# Patient Record
Sex: MALE | Race: WHITE
[De-identification: names, ages, dates, MRNs, and addresses within clinical notes are randomized per-mention and may not be internally consistent; named-entity substitution may affect disease eponyms.]

---

## 2017-06-03 ENCOUNTER — HOSPITAL ENCOUNTER (EMERGENCY)
Dept: HOSPITAL 62 - ER | Age: 57
Discharge: HOME | End: 2017-06-03
Payer: MEDICARE

## 2017-06-03 VITALS — SYSTOLIC BLOOD PRESSURE: 116 MMHG | DIASTOLIC BLOOD PRESSURE: 68 MMHG

## 2017-06-03 DIAGNOSIS — E11.649: ICD-10-CM

## 2017-06-03 DIAGNOSIS — I10: ICD-10-CM

## 2017-06-03 DIAGNOSIS — F17.210: ICD-10-CM

## 2017-06-03 DIAGNOSIS — M79.2: Primary | ICD-10-CM

## 2017-06-03 DIAGNOSIS — Z88.6: ICD-10-CM

## 2017-06-03 DIAGNOSIS — E78.00: ICD-10-CM

## 2017-06-03 LAB
ALBUMIN SERPL-MCNC: 3.9 G/DL (ref 3.5–5)
ALP SERPL-CCNC: 90 U/L (ref 38–126)
ALT SERPL-CCNC: 30 U/L (ref 21–72)
ANION GAP SERPL CALC-SCNC: 12 MMOL/L (ref 5–19)
AST SERPL-CCNC: 21 U/L (ref 17–59)
BASOPHILS # BLD AUTO: 0.1 10^3/UL (ref 0–0.2)
BASOPHILS NFR BLD AUTO: 0.9 % (ref 0–2)
BILIRUB DIRECT SERPL-MCNC: 0.3 MG/DL (ref 0–0.4)
BILIRUB SERPL-MCNC: 0.6 MG/DL (ref 0.2–1.3)
BUN SERPL-MCNC: 14 MG/DL (ref 7–20)
CALCIUM: 9.3 MG/DL (ref 8.4–10.2)
CHLORIDE SERPL-SCNC: 95 MMOL/L (ref 98–107)
CO2 SERPL-SCNC: 32 MMOL/L (ref 22–30)
CREAT SERPL-MCNC: 1.58 MG/DL (ref 0.52–1.25)
EOSINOPHIL # BLD AUTO: 0.2 10^3/UL (ref 0–0.6)
EOSINOPHIL NFR BLD AUTO: 1.6 % (ref 0–6)
ERYTHROCYTE [DISTWIDTH] IN BLOOD BY AUTOMATED COUNT: 13.2 % (ref 11.5–14)
GLUCOSE SERPL-MCNC: 74 MG/DL (ref 75–110)
HCT VFR BLD CALC: 42.6 % (ref 37.9–51)
HGB BLD-MCNC: 14.1 G/DL (ref 13.5–17)
HGB HCT DIFFERENCE: -0.3
LYMPHOCYTES # BLD AUTO: 3.6 10^3/UL (ref 0.5–4.7)
LYMPHOCYTES NFR BLD AUTO: 24.5 % (ref 13–45)
MCH RBC QN AUTO: 28.8 PG (ref 27–33.4)
MCHC RBC AUTO-ENTMCNC: 33 G/DL (ref 32–36)
MCV RBC AUTO: 87 FL (ref 80–97)
MONOCYTES # BLD AUTO: 1 10^3/UL (ref 0.1–1.4)
MONOCYTES NFR BLD AUTO: 6.7 % (ref 3–13)
NEUTROPHILS # BLD AUTO: 9.7 10^3/UL (ref 1.7–8.2)
NEUTS SEG NFR BLD AUTO: 66.3 % (ref 42–78)
POTASSIUM SERPL-SCNC: 4.2 MMOL/L (ref 3.6–5)
PROT SERPL-MCNC: 7.4 G/DL (ref 6.3–8.2)
RBC # BLD AUTO: 4.88 10^6/UL (ref 4.35–5.55)
SODIUM SERPL-SCNC: 138.7 MMOL/L (ref 137–145)
WBC # BLD AUTO: 14.7 10^3/UL (ref 4–10.5)

## 2017-06-03 PROCEDURE — 36415 COLL VENOUS BLD VENIPUNCTURE: CPT

## 2017-06-03 PROCEDURE — 93971 EXTREMITY STUDY: CPT

## 2017-06-03 PROCEDURE — 80053 COMPREHEN METABOLIC PANEL: CPT

## 2017-06-03 PROCEDURE — 96372 THER/PROPH/DIAG INJ SC/IM: CPT

## 2017-06-03 PROCEDURE — 85025 COMPLETE CBC W/AUTO DIFF WBC: CPT

## 2017-06-03 PROCEDURE — 99284 EMERGENCY DEPT VISIT MOD MDM: CPT

## 2017-06-03 PROCEDURE — 82962 GLUCOSE BLOOD TEST: CPT

## 2017-06-03 NOTE — RADIOLOGY REPORT (SQ)
EXAM DESCRIPTION:  VENOUS UNILATERAL LOWER



COMPLETED DATE/TIME:  6/3/2017 7:08 pm



REASON FOR STUDY:  R leg pain and swelling



COMPARISON:  None.



TECHNIQUE:  Dynamic and static gray scale and color images acquired of the right leg venous system. S
elected spectral images acquired with additional compression and augmentation maneuvers. The contrala
teral common femoral vein and saphenofemoral junction were also imaged. Images stored on PACS.



LIMITATIONS:  None.



FINDINGS:  COMMON FEMORAL: Normal phasicity, compression and augmentation. No visualized echogenic ma
terial on gray scale. No defects on color images.

FEMORAL: Normal compression and augmentation. No visualized echogenic material on gray scale. No defe
cts on color images.

POPLITEAL: Normal compression, augmentation. No visualized echogenic material on gray scale. No defec
ts on color images.

CALF VESSELS: Normal compression, augmentation. No visualized echogenic material on gray scale. No de
fects on color images.

GSV and SSV: Normal compression, augmentation. No visualized echogenic material on gray scale. No def
ects on color images.

ANY DEEP VENOUS INSUFFICIENCY: Not evaluated.

ANY EVIDENCE OF POPLITEAL CYST: No.

OTHER: No other significant finding.

CONTRALATERAL COMMON FEMORAL VEIN AND SAPHENOFEMORAL JUNCTION:

Normal phasicity, compression and augmentation. No visualized echogenic material on gray scale. No de
fects on color images.



IMPRESSION:  NO EVIDENCE OF DVT OR SVT IN THE RIGHT LEG.



TECHNICAL DOCUMENTATION:  JOB ID:  [id number]

 2011 [employer]- All Rights Reserved

## 2017-06-03 NOTE — ER DOCUMENT REPORT
ED General





- General


Chief Complaint: Leg Pain


Stated Complaint: RIGHT LEG PAIN


Time Seen by Provider: 06/03/17 16:28


Mode of Arrival: Wheelchair


Information source: Patient


Notes: 


6-year-old male presents with complaints of right thigh pain on the lateral 

anterior aspect of a few day duration.  Patient denies any fevers or chills 

nausea vomiting or diarrhea.  Patient denies any chest pain shortness of breath


TRAVEL OUTSIDE OF THE [place] IN LAST 30 DAYS: No





- HPI


Onset: Other


Onset/Duration: Waxing and waning


Quality of pain: Sharp


Severity: Mild


Pain Level: 2


Associated symptoms: Body/muscle aches, Other


Exacerbated by: Movement


Relieved by: Remaining still


Similar symptoms previously: No


Recently seen / treated by doctor: No





- Related Data


Allergies/Adverse Reactions: 


 





acetaminophen [From Tylenol] Allergy (Verified 06/03/17 16:10)


 











Past Medical History





- General


Information source: Patient





- Social History


Smoking Status: Current Every Day Smoker


Cigarette use (# per day): Yes


Chew tobacco use (# tins/day): Yes


Smoking Education Provided: No


Frequency of alcohol use: None


Drug Abuse: None


Family History: Reviewed & Not Pertinent


Patient has suicidal ideation: No


Patient has homicidal ideation: No





- Past Medical History


Cardiac Medical History: Reports: Hx Hypercholesterolemia, Hx Hypertension


Endocrine Medical History: Reports: Hx Diabetes Mellitus Type 2


Renal/ Medical History: Denies: Hx Peritoneal Dialysis


Past Surgical History: Reports: Hx Cardiac Catheterization





Review of Systems





- Review of Systems


Notes: 


REVIEW OF SYSTEMS:


CONSTITUTIONAL :  Denies fever,  chills, or sweats.  Denies recent illness.


EENT:   Denies eye, ear, throat, or mouth pain or symptoms.  Denies nasal or 

sinus congestion or discharge.  Denies throat, tongue, or mouth swelling or 

difficulty swallowing.


CARDIOVASCULAR:  Denies chest pain.  Denies palpitations or racing or irregular 

heart beat.  Denies ankle edema.


RESPIRATORY:  Denies cough, cold, or chest congestion.  Denies shortness of 

breath, difficulty breathing, or wheezing.


GASTROINTESTINAL:  Denies abdominal pain or distention.  Denies nausea, vomiting

, or diarrhea.  Denies blood in vomitus, stools, or per rectum.  Denies black, 

tarry stools.  Denies constipation.  


GENITOURINARY:  Denies difficulty urinating, painful urination, burning, 

frequency, blood in urine, or discharge.


MUSCULOSKELETAL: Right thigh pain


SKIN:   Denies rash, lesions or sores.


HEMATOLOGIC :   Denies easy bruising or bleeding.


LYMPHATIC:  Denies swollen, enlarged glands.


NEUROLOGICAL:  Denies confusion or altered mental status.  Denies passing out 

or loss of consciousness.  Denies dizziness or lightheadedness.  Denies 

headache.  Denies weakness or paralysis or loss of use of either side.  Denies 

problems with gait or speech.  Denies sensory loss, numbness, or tingling.  

Denies seizures.


PSYCHIATRIC:  Denies anxiety or stress.  Denies depression, suicidal ideation, 

or homicidal ideation.





ALL OTHER SYSTEMS REVIEWED AND NEGATIVE.





Dictation was performed using Dragon voice recognition software








PHYSICAL EXAMINATION:





GENERAL: Well-appearing, well-nourished and in no acute distress.





HEAD: Atraumatic, normocephalic.





EYES: Pupils equal round and reactive to light, extraocular movements intact, 

sclera anicteric, conjunctiva are normal.





ENT: Nares patent, oropharynx clear without exudates.  Moist mucous membranes.





NECK: Normal range of motion, supple without lymphadenopathy





LUNGS: Breath sounds clear to auscultation bilaterally and equal.  No wheezes 

rales or rhonchi.





HEART: Regular rate and rhythm without murmurs





ABDOMEN: Soft, nontender, nondistended abdomen.  No guarding, no rebound.  No 

masses appreciated.





Musculoskeletal: Normal range of motion, no pitting or edema.  No cyanosis.





NEUROLOGICAL: Cranial nerves grossly intact.  Normal speech, normal gait.  

Normal sensory, motor exams 





PSYCH: Normal mood, normal affect.





SKIN: Warm, Dry, normal turgor, no rashes or lesions noted.





Physical Exam





- Vital signs


Vitals: 


 











Temp Pulse Resp BP Pulse Ox


 


 98.7 F   74   16   100/63   92 


 


 06/03/17 16:14  06/03/17 16:14  06/03/17 16:14  06/03/17 16:14  06/03/17 16:14














Course





- Re-evaluation


Re-evalutation: 


06/03/17 19:40


Patient's blood sugar is noted to be low, patient states she has not been 

eating well.  I will give him a meal here since after juice and crackers patient

's blood sugar was only 56.  Otherwise his main complaint of leg pain is 

consistent with nerve tenderness.  He will be treated with abdomen, ultrasound 

was negative





06/03/17 21:20


Patient's blood sugars improved, patient is not happy that he is receiving 

Percocet and Neurontin, states he already has dose, I will therefore treat him 

with Toradol and have him follow-up with his primary care physician it appears 

patient takes 900 mg of Neurontin 3 times a day and already takes 15 mg of 

Percocet every 6 hours





Medically stable for discharge








After performing a Medical Screening Examination, I estimate there is LOW risk 

for INTRACRANIAL HEMORRHAGE, UNSTABLE SPINE FRACTURE, CENTRAL CORD SYNDROME, 

CAUDA EQUINA, THORACIC AORTIC DISSECTION, PNEUMOTHORAX, PERFORATED BOWEL, 

RUPTURED ABDOMINAL AORTIC ANEURYSM, ACUTE TENDON RUPTURE, COMPARTMENT SYNDROME, 

or OPEN FRACTURE, thus I consider the discharge disposition reasonable. Also, 

there is no evidence or peritonitis, sepsis, or toxicity.  I have reevaluated 

this patient multiple times and no significant life threatening changes are 

noted. The patient and I have discussed the diagnosis and risks, and we agree 

with discharging home to follow-up with their primary doctor with the 

understanding that symptoms and presentations can change. We also discussed 

returning to the Emergency Department immediately if new or worsening symptoms 

occur. We have discussed the symptoms which are most concerning (e.g., bloody 

stool, fever, changing or worsening pain, vomiting) that necessitate immediate 

return.





- Vital Signs


Vital signs: 


 











Temp Pulse Resp BP Pulse Ox


 


 98.2 F   68   18   110/58 L  98 


 


 06/03/17 18:36  06/03/17 18:36  06/03/17 18:36  06/03/17 18:36  06/03/17 18:36














- Laboratory


Result Diagrams: 


 06/03/17 17:05





 06/03/17 17:05


Laboratory results interpreted by me: 


 











  06/03/17 06/03/17 06/03/17





  17:05 17:05 18:41


 


WBC  14.7 H  


 


Plt Count  500 H  


 


Absolute Neutrophils  9.7 H  


 


Chloride   95 L 


 


Carbon Dioxide   32 H 


 


Creatinine   1.58 H 


 


Est GFR ( Amer)   55 L 


 


Est GFR (Non-Af Amer)   46 L 


 


Glucose   74 L 


 


POC Glucose    50 L














  06/03/17 06/03/17





  19:38 20:51


 


WBC  


 


Plt Count  


 


Absolute Neutrophils  


 


Chloride  


 


Carbon Dioxide  


 


Creatinine  


 


Est GFR ( Amer)  


 


Est GFR (Non-Af Amer)  


 


Glucose  


 


POC Glucose  56 L  123 H














- Diagnostic Test


Radiology reviewed: Image reviewed, Reports reviewed - No acute abnormality





Discharge





- Discharge


Clinical Impression: 


 Nerve pain, Hypoglycemia





Condition: Stable


Disposition: HOME, SELF-CARE


Instructions:  Neuropathy (OM)


Prescriptions: 


Ketorolac Tromethamine [Toradol 10 mg Tablet] 10 mg PO Q6HP PRN #40 tablet


 PRN Reason: 


Gabapentin [Neurontin 300 mg Capsule] 300 mg PO Q12 #30 cap


Referrals: 


DUKE SIFUENTES MD [Primary Care Provider] - Follow up in 3-5 days

## 2017-06-03 NOTE — ER DOCUMENT REPORT
ED Medical Screen (RME)





- General


Chief Complaint: Leg Pain


Stated Complaint: RIGHT LEG PAIN


Time Seen by Provider: 06/03/17 16:28


Mode of Arrival: Wheelchair


Information source: Patient


TRAVEL OUTSIDE OF THE [place] IN LAST 30 DAYS: No





- HPI


Patient complains to provider of: R leg pain


Onset: Other - Pt with 2-3 day h/o R upper leg pain with no h/o trauma.  Now 

feels like pain is migrating distally





- Related Data


Allergies/Adverse Reactions: 


 





acetaminophen [From Tylenol] Allergy (Verified 06/03/17 16:10)


 











Past Medical History





- Social History


Chew tobacco use (# tins/day): Yes


Frequency of alcohol use: None


Drug Abuse: None





- Past Medical History


Cardiac Medical History: Reports: Hx Hypercholesterolemia, Hx Hypertension


Endocrine Medical History: Reports: Hx Diabetes Mellitus Type 2


Renal/ Medical History: Denies: Hx Peritoneal Dialysis


Past Surgical History: Reports: Hx Cardiac Catheterization





Physical Exam





- Vital signs


Vitals: 





 











Temp Pulse Resp BP Pulse Ox


 


 98.7 F   74   16   100/63   92 


 


 06/03/17 16:14  06/03/17 16:14  06/03/17 16:14  06/03/17 16:14  06/03/17 16:14














Course





- Vital Signs


Vital signs: 





 











Temp Pulse Resp BP Pulse Ox


 


 98.7 F   74   16   100/63   92 


 


 06/03/17 16:14  06/03/17 16:14  06/03/17 16:14  06/03/17 16:14  06/03/17 16:14

## 2019-01-07 ENCOUNTER — HOSPITAL ENCOUNTER (EMERGENCY)
Dept: HOSPITAL 62 - ER | Age: 59
LOS: 1 days | Discharge: HOME | End: 2019-01-08
Payer: MEDICARE

## 2019-01-07 DIAGNOSIS — E78.00: ICD-10-CM

## 2019-01-07 DIAGNOSIS — I10: ICD-10-CM

## 2019-01-07 DIAGNOSIS — Z88.6: ICD-10-CM

## 2019-01-07 DIAGNOSIS — F17.200: ICD-10-CM

## 2019-01-07 DIAGNOSIS — N39.0: ICD-10-CM

## 2019-01-07 DIAGNOSIS — E11.65: Primary | ICD-10-CM

## 2019-01-07 DIAGNOSIS — Z79.4: ICD-10-CM

## 2019-01-07 LAB
ADD MANUAL DIFF: NO
ALBUMIN SERPL-MCNC: 3.9 G/DL (ref 3.5–5)
ALP SERPL-CCNC: 109 U/L (ref 38–126)
ALT SERPL-CCNC: 22 U/L (ref 21–72)
ANION GAP SERPL CALC-SCNC: 8 MMOL/L (ref 5–19)
APPEARANCE UR: (no result)
APTT PPP: YELLOW S
AST SERPL-CCNC: 17 U/L (ref 17–59)
BASE EXCESS BLDV CALC-SCNC: 1.6 MMOL/L
BASOPHILS # BLD AUTO: 0.1 10^3/UL (ref 0–0.2)
BASOPHILS NFR BLD AUTO: 0.9 % (ref 0–2)
BILIRUB DIRECT SERPL-MCNC: 0.3 MG/DL (ref 0–0.4)
BILIRUB SERPL-MCNC: 0.5 MG/DL (ref 0.2–1.3)
BILIRUB UR QL STRIP: NEGATIVE
BUN SERPL-MCNC: 18 MG/DL (ref 7–20)
CALCIUM: 9.5 MG/DL (ref 8.4–10.2)
CHLORIDE SERPL-SCNC: 100 MMOL/L (ref 98–107)
CO2 SERPL-SCNC: 30 MMOL/L (ref 22–30)
EOSINOPHIL # BLD AUTO: 0.1 10^3/UL (ref 0–0.6)
EOSINOPHIL NFR BLD AUTO: 0.8 % (ref 0–6)
ERYTHROCYTE [DISTWIDTH] IN BLOOD BY AUTOMATED COUNT: 14 % (ref 11.5–14)
GLUCOSE SERPL-MCNC: 269 MG/DL (ref 75–110)
GLUCOSE UR STRIP-MCNC: >=500 MG/DL
HCO3 BLDV-SCNC: 27.9 MMOL/L (ref 20–32)
HCT VFR BLD CALC: 44.4 % (ref 37.9–51)
HGB BLD-MCNC: 15 G/DL (ref 13.5–17)
KETONES UR STRIP-MCNC: NEGATIVE MG/DL
LYMPHOCYTES # BLD AUTO: 2.5 10^3/UL (ref 0.5–4.7)
LYMPHOCYTES NFR BLD AUTO: 15.1 % (ref 13–45)
MCH RBC QN AUTO: 29.3 PG (ref 27–33.4)
MCHC RBC AUTO-ENTMCNC: 33.7 G/DL (ref 32–36)
MCV RBC AUTO: 87 FL (ref 80–97)
MONOCYTES # BLD AUTO: 0.7 10^3/UL (ref 0.1–1.4)
MONOCYTES NFR BLD AUTO: 4 % (ref 3–13)
NEUTROPHILS # BLD AUTO: 13 10^3/UL (ref 1.7–8.2)
NEUTS SEG NFR BLD AUTO: 79.2 % (ref 42–78)
NITRITE UR QL STRIP: NEGATIVE
PCO2 BLDV: 49.9 MMHG (ref 35–63)
PH BLDV: 7.37 [PH] (ref 7.3–7.42)
PH UR STRIP: 5 [PH] (ref 5–9)
PLATELET # BLD: 516 10^3/UL (ref 150–450)
POTASSIUM SERPL-SCNC: 4.1 MMOL/L (ref 3.6–5)
PROT SERPL-MCNC: 7 G/DL (ref 6.3–8.2)
PROT UR STRIP-MCNC: NEGATIVE MG/DL
RBC # BLD AUTO: 5.11 10^6/UL (ref 4.35–5.55)
SODIUM SERPL-SCNC: 138.1 MMOL/L (ref 137–145)
SP GR UR STRIP: 1.02
TOTAL CELLS COUNTED % (AUTO): 100 %
UROBILINOGEN UR-MCNC: NEGATIVE MG/DL (ref ?–2)
WBC # BLD AUTO: 16.4 10^3/UL (ref 4–10.5)

## 2019-01-07 PROCEDURE — 99284 EMERGENCY DEPT VISIT MOD MDM: CPT

## 2019-01-07 PROCEDURE — 85025 COMPLETE CBC W/AUTO DIFF WBC: CPT

## 2019-01-07 PROCEDURE — 36415 COLL VENOUS BLD VENIPUNCTURE: CPT

## 2019-01-07 PROCEDURE — 94640 AIRWAY INHALATION TREATMENT: CPT

## 2019-01-07 PROCEDURE — 71046 X-RAY EXAM CHEST 2 VIEWS: CPT

## 2019-01-07 PROCEDURE — 83735 ASSAY OF MAGNESIUM: CPT

## 2019-01-07 PROCEDURE — 96372 THER/PROPH/DIAG INJ SC/IM: CPT

## 2019-01-07 PROCEDURE — 81001 URINALYSIS AUTO W/SCOPE: CPT

## 2019-01-07 PROCEDURE — 82803 BLOOD GASES ANY COMBINATION: CPT

## 2019-01-07 PROCEDURE — 80053 COMPREHEN METABOLIC PANEL: CPT

## 2019-01-07 PROCEDURE — 82962 GLUCOSE BLOOD TEST: CPT

## 2019-01-07 NOTE — RADIOLOGY REPORT (SQ)
EXAM DESCRIPTION:  CHEST 2 VIEWS



COMPLETED DATE/TIME:  1/7/2019 7:25 pm



REASON FOR STUDY:  sob wheezing



COMPARISON:  None.



NUMBER OF VIEWS:  Two view.



TECHNIQUE:  Frontal and lateral radiographic views of the chest acquired.



LIMITATIONS:  None.



FINDINGS:  LUNGS AND PLEURA: No opacities, masses or pneumothorax. No pleural effusion. Attenuated bl
ood vessels and flattened wilfredo-diaphragms.

MEDIASTINUM AND HILAR STRUCTURES: No masses.  No contour abnormalities.

HEART AND VASCULAR STRUCTURES: Heart normal in size and contour.  No evidence for failure.

BONES: No acute findings.

HARDWARE: None in the chest.

OTHER: No other significant finding.



IMPRESSION:  COPD.  NO ACUTE RADIOGRAPHIC FINDING IN THE CHEST.



TECHNICAL DOCUMENTATION:  JOB ID:  [id number]

 2011 [employer]- All Rights Reserved



Reading location - IP/workstation name: [email or web address]

## 2019-01-07 NOTE — ER DOCUMENT REPORT
ED Medical Screen (RME)





- General


Chief Complaint: High Blood Sugar


Stated Complaint: BLOOD SUGAR ISSUE


Time Seen by Provider: 01/07/19 19:08


Notes: 





RAPID MEDICAL EVALUATION DISCLOSURE


I have seen this patient as part of a Rapid Medical Evaluation and, if 

applicable, placed any initially appropriate orders. The patient will be seen 

and fully evaluated, including a full history and physical exam, by a provider 

(in Main ED or Fast Track) when a room becomes available.





------------------------------------------------------------------





58-year-old male PMH diabetes here with complaints of elevated blood sugars over

the past few weeks.  He has had dry mouth, urinary frequency, excessive thirst 

over this same timeframe.  He states that he just recently found out that his 

refrigerator has not been working appropriately and that his insulin has 

actually been freezing over rendering it useless.  He thinks that this "bad 

insulin" is a reason for his hyperglycemia.  His usual blood sugar ranges in the

100s.  He also complains of some shortness of breath and wheezing that he states

is baseline for him and is not any worse than it normally is.  He does continue 

to smoke cigarettes.





EXAM


Mild end expiratory wheezes


Normal aeration throughout


RRR








TRAVEL OUTSIDE OF THE [place] IN LAST 30 DAYS: No





- Related Data


Allergies/Adverse Reactions: 


                                        





acetaminophen [From Tylenol] Allergy (Verified 06/03/17 16:10)


   











Past Medical History





- Social History


Chew tobacco use (# tins/day): No


Frequency of alcohol use: None


Drug Abuse: None





- Past Medical History


Cardiac Medical History: Reports: Hx Hypercholesterolemia, Hx Hypertension


Endocrine Medical History: Reports: Hx Diabetes Mellitus Type 2


Renal/ Medical History: Denies: Hx Peritoneal Dialysis


Past Surgical History: Reports: Hx Cardiac Catheterization





Physical Exam





- Vital signs


Vitals: 





                                        











Temp Pulse Resp BP Pulse Ox


 


 99.0 F   77   16   134/71 H  96 


 


 01/07/19 18:07  01/07/19 18:07  01/07/19 18:07  01/07/19 18:07  01/07/19 18:07














Course





- Vital Signs


Vital signs: 





                                        











Temp Pulse Resp BP Pulse Ox


 


 99.0 F   77   16   134/71 H  96 


 


 01/07/19 18:07  01/07/19 18:07  01/07/19 18:07  01/07/19 18:07  01/07/19 18:07














Doctor's Discharge





- Discharge


Referrals: 


DUKE SIFUENTES MD [Primary Care Provider] - Follow up as needed

## 2019-01-08 VITALS — DIASTOLIC BLOOD PRESSURE: 75 MMHG | SYSTOLIC BLOOD PRESSURE: 121 MMHG

## 2019-01-08 NOTE — ER DOCUMENT REPORT
ED General





- General


Chief Complaint: High Blood Sugar


Stated Complaint: BLOOD SUGAR ISSUE


Time Seen by Provider: 01/07/19 19:08


Notes: 


Patient is a 58-year-old male presents with complaint with blood sugar running 

high.  Patient says is been ongoing for weeks.  Says is been increasing his 

insulin but does not seem be working.  They realize that his insulin is 

effective because his refrigerator malfunction and his insulin in the freezing. 

Therefore is come to the ER.  He also has a urinary tract infection.  He said 1 

month ago was diagnosed and placed on Cipro and his symptoms resolved but the 

last week he started having some burning with urination.  He also has some 

burning at the tip of his penis.  No abnormal discharge.  No fevers.  No other 

complaints at this time.





TRAVEL OUTSIDE OF THE [place] IN LAST 30 DAYS: No





- Related Data


Allergies/Adverse Reactions: 


                                        





acetaminophen [From Tylenol] Allergy (Verified 06/03/17 16:10)


   











Past Medical History





- Social History


Smoking Status: Current Every Day Smoker


Chew tobacco use (# tins/day): No


Frequency of alcohol use: None


Drug Abuse: None


Family History: Reviewed & Not Pertinent


Patient has suicidal ideation: No


Patient has homicidal ideation: No





- Past Medical History


Cardiac Medical History: Reports: Hx Hypercholesterolemia, Hx Hypertension


Endocrine Medical History: Reports: Hx Diabetes Mellitus Type 2


Renal/ Medical History: Denies: Hx Peritoneal Dialysis


Past Surgical History: Reports: Hx Cardiac Catheterization





Review of Systems





- Review of Systems


Notes: 





My Normal Review Basic





REVIEW OF SYSTEMS:


CONSTITUTIONAL :  Denies fever,  chills, or sweats.  Denies recent illness.


EENT:   Denies eye, ear, throat, or mouth pain or symptoms.  Denies nasal or 

sinus congestion.


CARDIOVASCULAR:  Denies chest pain.


RESPIRATORY:  Denies cough, cold, or chest congestion.  Denies shortness of 

breath, difficulty breathing, or wheezing.


GASTROINTESTINAL: Suprapubic abdominal pain.  Denies nausea, vomiting, or di

arrhea.  Denies constipation.  Last BM: 


GENITOURINARY: Dysuria and urinary frequency


MUSCULOSKELETAL:  Denies neck or back pain or joint pain or swelling.


SKIN:   Denies rash or skin lesions.


NEUROLOGICAL:  Denies altered mental status or loss of consciousness.  Denies 

headache. 


ALL OTHER SYSTEMS REVIEWED AND NEGATIVE.





Physical Exam





- Vital signs


Vitals: 


                                        











Temp Pulse Resp BP Pulse Ox


 


 99.0 F   77   16   134/71 H  96 


 


 01/07/19 18:07  01/07/19 18:07  01/07/19 18:07  01/07/19 18:07  01/07/19 18:07














- Notes


Notes: 





General Appearance: Well nourished, alert, cooperative, no acute distress, no 

obvious discomfort.  Well-appearing.


Vitals: reviewed, See vital signs table.


Head: no swelling or tenderness to the head


Eyes: PERRL, EOMI, Conjuctiva clear


Mouth: No decreasd moisture


Throat: No tonsillar inflammation, No airway obstruction,  No lymphadenopathy


Neck: Supple, no neck tenderness, No thyromegaly


Lungs: No wheezing, No rales, No rhonci, No accessory muscle use, good air 

exchange bilaterally.


Heart: Normal rate, Regular rythm, No murmur, no rub


Abdomen: Normal BS, soft, No rigidity, some suprapubic abdominal tenderness to 

palpation, No guarding, no rebound, no abdominal masses, no organomegaly


Genital: No redness or swelling to the genitalia.  No signs of cellulitis or 

scrotal infection.


Extremities:  good pulses in all extremities, no swelling or tenderness in the 

extremities, no edema.


Skin: warm, dry, appropriate color, no rash


Neuro: speech clear, oriented x 3, normal affect, responds appropriately to 

questions.





Course





- Re-evaluation


Re-evalutation: 





01/08/19 00:57


Patient's blood sugars is now down to 200s p.o. given to small dose of insulin. 

I wrote prescription for new insulin in case he needs prescriptions.  He said he

should be able to get a new insulin through his old prescriptions but informed 

on the right knee prescriptions in case he cannot get through his old prescript

ion.  Patient really takes Lantus 60 units daily with a sliding scale of 

Humalog.  Informed him to start off with the Lantus.  If he still needs to use a

sliding scale he can but to only go based on a sliding scale do not exceed a 

sliding scale.  He is to return to ER if he has blood sugars approaching 400, 

fevers, worsening signs of infection, or feel unwell.  Patient while he was here

in the ED requested several times to have food as well as soda.  Informed him 

that right now he does have some crackers and therefore try to give him some 

food and crackers.  Patient does start yelling at the nurse because she would 

only give him diet soda not regular soda.  I informed patient that is not good 

he is drinking regular soda or being that is a diabetic and that Rosina not 

good that he wants to drink regular soda a when he comes here for high blood 

sugar.  Patient says that he always drinks regular soda and that that is not the

problem.  I informed him that it is a problem therefore he should stop that.  I 

do not think patient will stop drinking regular soda as he did not seem to want 

to agree with me on this.  Patient otherwise is stable be discharged home.





Dictation of this chart was performed using voice recognition software; 

therefore, there may be some unintended grammatical errors.





- Vital Signs


Vital signs: 


                                        











Temp Pulse Resp BP Pulse Ox


 


 99.0 F   77   16   134/71 H  96 


 


 01/07/19 18:07  01/07/19 18:07  01/07/19 18:07  01/07/19 18:07  01/07/19 18:07














- Laboratory


Result Diagrams: 


                                 01/07/19 19:50





                                 01/07/19 22:05


Laboratory results interpreted by me: 


                                        











  01/07/19 01/07/19 01/07/19





  19:08 19:50 20:00


 


WBC   16.4 H 


 


Plt Count   516 H 


 


Seg Neutrophils %   79.2 H 


 


Absolute Neutrophils   13.0 H 


 


Glucose   


 


POC Glucose  331 H  


 


Urine Glucose (UA)    >=500 H


 


Urine Blood    SMALL H


 


Ur Leukocyte Esterase    MODERATE H














  01/07/19 01/07/19 01/08/19





  22:05 22:07 00:20


 


WBC   


 


Plt Count   


 


Seg Neutrophils %   


 


Absolute Neutrophils   


 


Glucose  269 H  


 


POC Glucose   241 H  234 H


 


Urine Glucose (UA)   


 


Urine Blood   


 


Ur Leukocyte Esterase   














Discharge





- Discharge


Clinical Impression: 


 Hyperglycemia





UTI (urinary tract infection)


Qualifiers:


 Urinary tract infection type: site unspecified Hematuria presence: without 

hematuria Qualified Code(s): N39.0 - Urinary tract infection, site not specified





Condition: Good


Disposition: HOME, SELF-CARE


Additional Instructions: 


I have written a prescription for your insulin in case your prescription has run

out and you need another prescription. Please take the antibiotic as prescribed 

for your UTI. Please consider drinking diet soda instead of regular soda. Please

return to the ER if you feel unwell, have blood sugars above 400, fevers, or 

have any further concerns. Please try to quit smoking.


Prescriptions: 


Cephalexin Monohydrate [Keflex 500 mg Capsule] 500 mg PO BID 5 Days #10 capsule


Insulin Glargine,Hum.rec.anlog [Lantus Insulin 100 Unit/1 ml 10 ml] 60 unit 

SUBCUT DAILY #1000 unit


Insulin Lispro [Humalog Insulin 100 Unit/1 ml 3 ml Vial] 0 unit SUBCUT .SLD 

SCALE #10 ml


Referrals: 


DUKE SIFUENTES MD [Primary Care Provider] - Follow up in 3-5 days